# Patient Record
Sex: MALE | Race: BLACK OR AFRICAN AMERICAN | Employment: FULL TIME | URBAN - NONMETROPOLITAN AREA
[De-identification: names, ages, dates, MRNs, and addresses within clinical notes are randomized per-mention and may not be internally consistent; named-entity substitution may affect disease eponyms.]

---

## 2021-03-02 ENCOUNTER — HOSPITAL ENCOUNTER (EMERGENCY)
Age: 51
Discharge: HOME | End: 2021-03-02
Attending: EMERGENCY MEDICINE

## 2021-03-02 VITALS
HEART RATE: 89 BPM | RESPIRATION RATE: 16 BRPM | BODY MASS INDEX: 24.44 KG/M2 | OXYGEN SATURATION: 98 % | WEIGHT: 165 LBS | TEMPERATURE: 98.1 F | SYSTOLIC BLOOD PRESSURE: 139 MMHG | DIASTOLIC BLOOD PRESSURE: 79 MMHG | HEIGHT: 69 IN

## 2021-03-02 DIAGNOSIS — M54.50 ACUTE BILATERAL LOW BACK PAIN WITHOUT SCIATICA: Primary | ICD-10-CM

## 2021-03-02 LAB
APPEARANCE UR: CLEAR
BACTERIA URNS QL MICRO: NEGATIVE /HPF
BILIRUB UR QL: NEGATIVE
COLOR UR: ABNORMAL
GLUCOSE UR STRIP.AUTO-MCNC: NEGATIVE MG/DL
HGB UR QL STRIP: ABNORMAL
KETONES UR QL STRIP.AUTO: NEGATIVE MG/DL
LEUKOCYTE ESTERASE UR QL STRIP.AUTO: ABNORMAL
NITRITE UR QL STRIP.AUTO: NEGATIVE
PH UR STRIP: 5.5 [PH] (ref 5–8)
PROT UR STRIP-MCNC: NEGATIVE MG/DL
RBC #/AREA URNS HPF: NORMAL /HPF (ref 0–3)
SP GR UR REFRACTOMETRY: 1 (ref 1–1.03)
UROBILINOGEN UR QL STRIP.AUTO: 0.2 EU/DL (ref 0.2–1)
WBC URNS QL MICRO: NORMAL /HPF (ref 0–5)

## 2021-03-02 PROCEDURE — 81001 URINALYSIS AUTO W/SCOPE: CPT

## 2021-03-02 PROCEDURE — 99283 EMERGENCY DEPT VISIT LOW MDM: CPT

## 2021-03-02 PROCEDURE — 74011250636 HC RX REV CODE- 250/636: Performed by: EMERGENCY MEDICINE

## 2021-03-02 PROCEDURE — 74011250637 HC RX REV CODE- 250/637: Performed by: EMERGENCY MEDICINE

## 2021-03-02 PROCEDURE — 96372 THER/PROPH/DIAG INJ SC/IM: CPT

## 2021-03-02 PROCEDURE — 74011636637 HC RX REV CODE- 636/637: Performed by: EMERGENCY MEDICINE

## 2021-03-02 RX ORDER — METHOCARBAMOL 750 MG/1
750 TABLET, FILM COATED ORAL 4 TIMES DAILY
Qty: 28 TAB | Refills: 0 | Status: SHIPPED | OUTPATIENT
Start: 2021-03-02 | End: 2021-03-09

## 2021-03-02 RX ORDER — PREDNISONE 20 MG/1
40 TABLET ORAL
Status: COMPLETED | OUTPATIENT
Start: 2021-03-02 | End: 2021-03-02

## 2021-03-02 RX ORDER — KETOROLAC TROMETHAMINE 30 MG/ML
60 INJECTION, SOLUTION INTRAMUSCULAR; INTRAVENOUS
Status: COMPLETED | OUTPATIENT
Start: 2021-03-02 | End: 2021-03-02

## 2021-03-02 RX ORDER — HYDROCODONE BITARTRATE AND ACETAMINOPHEN 5; 325 MG/1; MG/1
2 TABLET ORAL
Status: COMPLETED | OUTPATIENT
Start: 2021-03-02 | End: 2021-03-02

## 2021-03-02 RX ADMIN — KETOROLAC TROMETHAMINE 60 MG: 30 INJECTION, SOLUTION INTRAMUSCULAR at 12:31

## 2021-03-02 RX ADMIN — PREDNISONE 40 MG: 20 TABLET ORAL at 12:30

## 2021-03-02 RX ADMIN — HYDROCODONE BITARTRATE AND ACETAMINOPHEN 2 TABLET: 5; 325 TABLET ORAL at 12:30

## 2021-03-02 NOTE — ED NOTES
I have reviewed discharge instructions with the patient. The patient verbalized understanding. Pts verbally confirmed understanding of need for follow up with primary care provider. Important sections of discharge instructions highlighted for patient. Regine Yang RN      Pt is not in any current distress and shows no evidence of clinical instability. Pt is hemodynamically/respiratorily stable. Armband removed. Paperwork given by provider and reviewed with patient opportunity for questions/clarification given. Pt denies any additional complaints. Pt ambulated out of ED.     Patient Vitals for the past 4 hrs:   Temp Pulse Resp BP SpO2   03/02/21 1041 98.1 °F (36.7 °C) 89 16 139/79 98 %         Regine Yang RN

## 2021-03-02 NOTE — Clinical Note
[address] 
[facility] EMERGENCY DEPT 
[address] 
[phone] Work/School Note Date: 3/2/2021 To Whom It May concern: 
 
Kennedy Adan was seen and treated today in the emergency room by the following provider(s): 
Attending Provider: Christina Kessler MD. Kennedy Adan is excused from work/school on 03/02/21 and 03/03/21. He is medically clear to return to work/school on 3/4/2021.   
 
 
Sincerely, 
 
 
 
 
Enrrique Munoz MD

## 2021-03-02 NOTE — DISCHARGE INSTRUCTIONS
Thank you! Thank you for allowing me to care for you in the emergency department. I sincerely hope that you are satisfied with your visit today. It is my goal to provide you with excellent care. Below you will find a list of your labs and imaging from your visit today. Should you have any questions regarding these results please do not hesitate to call the emergency department. Labs -     Recent Results (from the past 12 hour(s))   URINALYSIS W/ RFLX MICROSCOPIC    Collection Time: 03/02/21 10:50 AM   Result Value Ref Range    Color Yellow/Straw      Appearance Clear Clear      Specific gravity 1.005 1.003 - 1.030      pH (UA) 5.5 5.0 - 8.0      Protein Negative Negative mg/dL    Glucose Negative Negative mg/dL    Ketone Negative Negative mg/dL    Bilirubin Negative Negative      Blood Trace (A) Negative      Urobilinogen 0.2 0.2 - 1.0 EU/dL    Nitrites Negative Negative      Leukocyte Esterase Small (A) Negative     URINE MICROSCOPIC    Collection Time: 03/02/21 10:50 AM   Result Value Ref Range    WBC 5-10 0 - 5 /hpf    RBC 0-5 0 - 3 /hpf    Bacteria Negative Negative /hpf       Radiologic Studies -   No orders to display     CT Results  (Last 48 hours)      None          CXR Results  (Last 48 hours)      None               If you feel that you have not received excellent quality care or timely care, please ask to speak to the nurse manager. Please choose us in the future for your continued health care needs. ------------------------------------------------------------------------------------------------------------  The exam and treatment you received in the Emergency Department were for an urgent problem and are not intended as complete care. It is important that you follow-up with a doctor, nurse practitioner, or physician assistant to:  (1) confirm your diagnosis,  (2) re-evaluation of changes in your illness and treatment, and  (3) for ongoing care.   If your symptoms become worse or you do not improve as expected and you are unable to reach your usual health care provider, you should return to the Emergency Department. We are available 24 hours a day. Please take your discharge instructions with you when you go to your follow-up appointment. If you have any problem arranging a follow-up appointment, contact the Emergency Department immediately. If a prescription has been provided, please have it filled as soon as possible to prevent a delay in treatment. Read the entire medication instruction sheet provided to you by the pharmacy. If you have any questions or reservations about taking the medication due to side effects or interactions with other medications, please call your primary care physician or contact the ER to speak with the charge nurse. Make an appointment with your family doctor or the physician you were referred to for follow-up of this visit as instructed on your discharge paperwork, as this is a mandatory follow-up. Return to the ER if you are unable to be seen or if you are unable to be seen in a timely manner. If you have any problem arranging the follow-up visit, contact the Emergency Department immediately.

## 2021-03-03 NOTE — ED PROVIDER NOTES
EMERGENCY DEPARTMENT HISTORY AND PHYSICAL EXAM      Date: 3/2/2021  Patient Name: Hugh López    History of Presenting Illness     Chief Complaint   Patient presents with    Back Pain       History Provided By: Patient    HPI: Hugh López, 45 y.o. male with a past medical history significant hypertension, osteoarthritis and Lumbar Strain presents to the ED with cc of severe back spasms x 24 hours. Symptoms worsen with movement. Pain scale was 9/10. No Hx of trauma, no lower extremities numbness or tingling, no edema, no bleeding, no fevers, no vomiting, no headaches and no Hx of ataxia    There are no other complaints, changes, or physical findings at this time. PCP: None    No current facility-administered medications on file prior to encounter. No current outpatient medications on file prior to encounter. Past History     Past Medical History: H/o Hypertension, Back Spasms  History reviewed. No pertinent past medical history. Past Surgical History: Non-contributory  History reviewed. No pertinent surgical history. Family History: H/O Hypertension & Cancer   History reviewed. No pertinent family history. Social History: with Children  Social History     Tobacco Use    Smoking status: Current Every Day Smoker     Packs/day: 0.25    Smokeless tobacco: Never Used   Substance Use Topics    Alcohol use: Never     Frequency: Never    Drug use: Never       Allergies:  No Known Allergies      Review of Systems     Review of Systems   Constitutional: Negative. HENT: Negative. Eyes: Negative. Respiratory: Negative. Cardiovascular: Negative. Gastrointestinal: Negative. Endocrine: Negative. Genitourinary: Negative. Musculoskeletal: Positive for back pain and myalgias. Allergic/Immunologic: Negative. Neurological: Negative. Hematological: Negative. Psychiatric/Behavioral: Negative.         Physical Exam     Physical Exam  Vitals signs and nursing note reviewed. Constitutional:       Appearance: Normal appearance. He is normal weight. HENT:      Head: Normocephalic and atraumatic. Nose: Nose normal.      Mouth/Throat:      Mouth: Mucous membranes are dry. Eyes:      Extraocular Movements: Extraocular movements intact. Conjunctiva/sclera: Conjunctivae normal.      Pupils: Pupils are equal, round, and reactive to light. Neck:      Musculoskeletal: Normal range of motion and neck supple. Cardiovascular:      Rate and Rhythm: Normal rate and regular rhythm. Pulses: Normal pulses. Heart sounds: Normal heart sounds. Pulmonary:      Effort: Pulmonary effort is normal.      Breath sounds: Normal breath sounds. Abdominal:      General: Abdomen is flat. Bowel sounds are normal.      Palpations: Abdomen is soft. Musculoskeletal:         General: Swelling and tenderness present. Lumbar back: He exhibits decreased range of motion, tenderness and pain. Back:    Skin:     General: Skin is dry. Neurological:      General: No focal deficit present. Mental Status: He is alert and oriented to person, place, and time. Mental status is at baseline. Psychiatric:         Mood and Affect: Mood normal.         Lab and Diagnostic Study Results     Labs -   No results found for this or any previous visit (from the past 12 hour(s)). Radiologic Studies -   @lastxrresult@  CT Results  (Last 48 hours)    None        CXR Results  (Last 48 hours)    None            Medical Decision Making   - I am the first provider for this patient. - I reviewed the vital signs, available nursing notes, past medical history, past surgical history, family history and social history. - Initial assessment performed. The patients presenting problems have been discussed, and they are in agreement with the care plan formulated and outlined with them. I have encouraged them to ask questions as they arise throughout their visit.     Vital Signs-Reviewed the patient's vital signs. No data found. Records Reviewed: Nursing Notes    The patient presents with back pain with a differential diagnosis of  kidney stone, lumbar strain, pneumonia, pulmonary embolus, traumatic injury and viral syndrome      ED Course:   Patient's symptoms and examination was noted and reviewed. Patient's findings were consistent with Back spasms and was given pain medicines which improved his symptoms and was discharged to home. Provider Notes (Medical Decision Making):   Patient's symptoms and examination was noted and reviewed. Patient was given pain medicines which improved his symptoms and was discharged to home. MDM       Procedures   Medical Decision Makingedical Decision Making  Performed by: Geronimo Dumont MD  PROCEDURES  None  Procedures       Disposition   Disposition: DC- Adult Discharges: All of the diagnostic tests were reviewed and questions answered. Diagnosis, care plan and treatment options were discussed. The patient understands the instructions and will follow up as directed. The patients results have been reviewed with them. They have been counseled regarding their diagnosis. The patient verbally convey understanding and agreement of the signs, symptoms, diagnosis, treatment and prognosis and additionally agrees to follow up as recommended with their PCP in 24 - 48 hours. They also agree with the care-plan and convey that all of their questions have been answered. I have also put together some discharge instructions for them that include: 1) educational information regarding their diagnosis, 2) how to care for their diagnosis at home, as well a 3) list of reasons why they would want to return to the ED prior to their follow-up appointment, should their condition change. Discharged    DISCHARGE PLAN:  1. Cannot display discharge medications since this patient is not currently admitted.     2.   Follow-up Information     Follow up With Specialties Details Why Veda Beaver MD Internal Medicine Call in 3 days If symptoms worsen [address] [facility]  [phone]          3. Return to ED if worse   4. Discharge Medication List as of 3/2/2021  1:09 PM            Diagnosis     Clinical Impression:   1. Acute bilateral low back pain without sciatica        Attestations:    Milan Guardado MD    Please note that this dictation was completed with [employer], the computer voice recognition software. Quite often unanticipated grammatical, syntax, homophones, and other interpretive errors are inadvertently transcribed by the computer software. Please disregard these errors. Please excuse any errors that have escaped final proofreading. Thank you.

## 2021-07-07 ENCOUNTER — APPOINTMENT (OUTPATIENT)
Dept: ULTRASOUND IMAGING | Age: 51
End: 2021-07-07
Attending: EMERGENCY MEDICINE

## 2021-07-07 ENCOUNTER — HOSPITAL ENCOUNTER (EMERGENCY)
Age: 51
Discharge: HOME | End: 2021-07-07
Attending: EMERGENCY MEDICINE

## 2021-07-07 VITALS
SYSTOLIC BLOOD PRESSURE: 133 MMHG | OXYGEN SATURATION: 97 % | DIASTOLIC BLOOD PRESSURE: 96 MMHG | HEART RATE: 98 BPM | RESPIRATION RATE: 16 BRPM | TEMPERATURE: 98.4 F

## 2021-07-07 DIAGNOSIS — N45.2 ORCHITIS: Primary | ICD-10-CM

## 2021-07-07 LAB
APPEARANCE UR: CLEAR
BACTERIA URNS QL MICRO: ABNORMAL /HPF
BILIRUB UR QL: NEGATIVE
COLOR UR: ABNORMAL
GLUCOSE UR STRIP.AUTO-MCNC: 250 MG/DL
HGB UR QL STRIP: ABNORMAL
KETONES UR QL STRIP.AUTO: ABNORMAL MG/DL
LEUKOCYTE ESTERASE UR QL STRIP.AUTO: ABNORMAL
NITRITE UR QL STRIP.AUTO: NEGATIVE
PH UR STRIP: 6 [PH] (ref 5–8)
PROT UR STRIP-MCNC: NEGATIVE MG/DL
RBC #/AREA URNS HPF: ABNORMAL /HPF (ref 0–3)
SP GR UR REFRACTOMETRY: 1.02 (ref 1–1.03)
UROBILINOGEN UR QL STRIP.AUTO: 0.2 EU/DL (ref 0.2–1)
WBC URNS QL MICRO: ABNORMAL /HPF (ref 0–5)

## 2021-07-07 PROCEDURE — 74011000250 HC RX REV CODE- 250: Performed by: EMERGENCY MEDICINE

## 2021-07-07 PROCEDURE — 74011250636 HC RX REV CODE- 250/636: Performed by: EMERGENCY MEDICINE

## 2021-07-07 PROCEDURE — 81001 URINALYSIS AUTO W/SCOPE: CPT

## 2021-07-07 PROCEDURE — 96372 THER/PROPH/DIAG INJ SC/IM: CPT

## 2021-07-07 PROCEDURE — 76870 US EXAM SCROTUM: CPT

## 2021-07-07 PROCEDURE — 74011250637 HC RX REV CODE- 250/637: Performed by: EMERGENCY MEDICINE

## 2021-07-07 PROCEDURE — 99283 EMERGENCY DEPT VISIT LOW MDM: CPT

## 2021-07-07 RX ORDER — DOXYCYCLINE 100 MG/1
100 CAPSULE ORAL
Status: COMPLETED | OUTPATIENT
Start: 2021-07-07 | End: 2021-07-07

## 2021-07-07 RX ORDER — DOXYCYCLINE HYCLATE 100 MG
100 TABLET ORAL 2 TIMES DAILY
Qty: 20 TABLET | Refills: 0 | Status: SHIPPED | OUTPATIENT
Start: 2021-07-07 | End: 2021-07-17

## 2021-07-07 RX ORDER — IBUPROFEN 800 MG/1
800 TABLET ORAL
Qty: 20 TABLET | Refills: 0 | Status: SHIPPED | OUTPATIENT
Start: 2021-07-07 | End: 2021-07-14

## 2021-07-07 RX ORDER — HYDROCODONE BITARTRATE AND ACETAMINOPHEN 7.5; 325 MG/1; MG/1
1 TABLET ORAL ONCE
Status: COMPLETED | OUTPATIENT
Start: 2021-07-07 | End: 2021-07-07

## 2021-07-07 RX ORDER — KETOROLAC TROMETHAMINE 30 MG/ML
60 INJECTION, SOLUTION INTRAMUSCULAR; INTRAVENOUS
Status: COMPLETED | OUTPATIENT
Start: 2021-07-07 | End: 2021-07-07

## 2021-07-07 RX ADMIN — HYDROCODONE BITARTRATE AND ACETAMINOPHEN 1 TABLET: 7.5; 325 TABLET ORAL at 14:26

## 2021-07-07 RX ADMIN — LIDOCAINE HYDROCHLORIDE 250 MG: 10 INJECTION, SOLUTION INFILTRATION; PERINEURAL at 14:26

## 2021-07-07 RX ADMIN — DOXYCYCLINE 100 MG: 100 CAPSULE ORAL at 14:26

## 2021-07-07 RX ADMIN — KETOROLAC TROMETHAMINE 60 MG: 30 INJECTION, SOLUTION INTRAMUSCULAR at 14:26

## 2021-07-07 NOTE — ED PROVIDER NOTES
EMERGENCY DEPARTMENT HISTORY AND PHYSICAL EXAM      Date: 7/7/2021  Patient Name: Mariana Hartley    History of Presenting Illness     Chief Complaint   Patient presents with    Groin Pain       History Provided By: Patient    HPI: Mariana Hartley, 45 y.o. male with a past medical history significant No significant past medical history presents to the ED with cc of testicular pain for 2 days, patient denies any heavy lifting but stated that 2 days ago he was wearing boxer shorts while he was doing some vigorous dancing; pain intensity rated 10/10 worsened with direct pressure and ambulation    There are no other complaints, changes, or physical findings at this time. PCP: None    No current facility-administered medications on file prior to encounter. No current outpatient medications on file prior to encounter. Past History     Past Medical History:  History reviewed. No pertinent past medical history. Past Surgical History:  No past surgical history on file. Family History:  History reviewed. No pertinent family history. Social History:  Social History     Tobacco Use    Smoking status: Current Every Day Smoker     Packs/day: 0.25    Smokeless tobacco: Never Used   Substance Use Topics    Alcohol use: Never    Drug use: Never       Allergies:  No Known Allergies      Review of Systems     Review of Systems   Constitutional: Negative for chills and fever. HENT: Negative for rhinorrhea and sore throat. Eyes: Negative for pain and visual disturbance. Respiratory: Negative for cough and shortness of breath. Cardiovascular: Negative for chest pain and leg swelling. Gastrointestinal: Negative for abdominal pain and vomiting. Endocrine: Negative for polydipsia and polyuria. Genitourinary: Positive for scrotal swelling and testicular pain. Negative for dysuria. Musculoskeletal: Negative for back pain and neck pain. Skin: Negative for color change and pallor.    Neurological: Negative for weakness and numbness. Psychiatric/Behavioral: Negative. Physical Exam     Physical Exam  Vitals and nursing note reviewed. Constitutional:       General: He is not in acute distress. Appearance: Normal appearance. He is not ill-appearing or toxic-appearing. HENT:      Head: Normocephalic and atraumatic. Mouth/Throat:      Mouth: Mucous membranes are moist.      Pharynx: Oropharynx is clear. Eyes:      Extraocular Movements: Extraocular movements intact. Conjunctiva/sclera: Conjunctivae normal.      Pupils: Pupils are equal, round, and reactive to light. Cardiovascular:      Rate and Rhythm: Normal rate and regular rhythm. Pulses: Normal pulses. Heart sounds: Normal heart sounds. Pulmonary:      Effort: Pulmonary effort is normal.      Breath sounds: Normal breath sounds. Abdominal:      General: Bowel sounds are normal.   Genitourinary:     Penis: Normal and circumcised. Testes: Cremasteric reflex is present. Right: Tenderness not present. Left: Tenderness and swelling present. Epididymis:      Right: Normal.      Left: Normal.   Musculoskeletal:         General: No tenderness. Normal range of motion. Cervical back: Normal range of motion and neck supple. Lymphadenopathy:      Lower Body: Right inguinal adenopathy present. Left inguinal adenopathy present. Skin:     General: Skin is warm and dry. Capillary Refill: Capillary refill takes less than 2 seconds. Neurological:      General: No focal deficit present. Mental Status: He is alert and oriented to person, place, and time. Psychiatric:         Mood and Affect: Mood normal.         Behavior: Behavior normal.         Lab and Diagnostic Study Results     Labs -   No results found for this or any previous visit (from the past 12 hour(s)).     Radiologic Studies -   @lastxrresult@  CT Results  (Last 48 hours)    None        CXR Results  (Last 48 hours)    None Medical Decision Making   - I am the first provider for this patient. - I reviewed the vital signs, available nursing notes, past medical history, past surgical history, family history and social history. - Initial assessment performed. The patients presenting problems have been discussed, and they are in agreement with the care plan formulated and outlined with them. I have encouraged them to ask questions as they arise throughout their visit. Vital Signs-Reviewed the patient's vital signs. Patient Vitals for the past 12 hrs:   Temp Pulse Resp BP SpO2   07/07/21 1344 98.4 °F (36.9 °C) 98 16 (!) 133/96 99 %       Records Reviewed: Nursing Notes    The patient presents with testicular pain with a differential diagnosis of testicular torsion, orchitis, epididymitis      ED Course:          Provider Notes (Medical Decision Making): MDM       Procedures   Medical Decision Makingedical Decision Making  Performed by: Shanique Jim MD  PROCEDURES:  Procedures       Disposition   Disposition: Condition stable and improved  DC- Adult Discharges: All of the diagnostic tests were reviewed and questions answered. Diagnosis, care plan and treatment options were discussed. The patient understands the instructions and will follow up as directed. The patients results have been reviewed with them. They have been counseled regarding their diagnosis. The patient verbally convey understanding and agreement of the signs, symptoms, diagnosis, treatment and prognosis and additionally agrees to follow up as recommended with their PCP in 24 - 48 hours. They also agree with the care-plan and convey that all of their questions have been answered.   I have also put together some discharge instructions for them that include: 1) educational information regarding their diagnosis, 2) how to care for their diagnosis at home, as well a 3) list of reasons why they would want to return to the ED prior to their follow-up appointment, should their condition change. DISCHARGE PLAN:  1. There are no discharge medications for this patient. 2.   Follow-up Information    None       3. Return to ED if worse   4. There are no discharge medications for this patient. Diagnosis     Clinical Impression: No diagnosis found. Attestations:    Sourav Yuan MD    Please note that this dictation was completed with [employer], the computer voice recognition software. Quite often unanticipated grammatical, syntax, homophones, and other interpretive errors are inadvertently transcribed by the computer software. Please disregard these errors. Please excuse any errors that have escaped final proofreading. Thank you.

## 2021-07-07 NOTE — LETTER
NOTIFICATION RETURN TO WORK / SCHOOL    7/7/2021 4:15 PM    Mr. Sri Mata  [id number]      To Whom It May Concern:    Sri Mata is currently under the care of [facility] EMERGENCY DEPT. He will return to work/school on:7/14/21    Sri Mata may return to work/school with the following restrictions: Follow up with PCP before returning to work    If there are questions or concerns please have the patient contact our office.         Sincerely,      Yudelka Hoover RN

## 2021-07-07 NOTE — ED TRIAGE NOTES
Pt reports left testicular pain that started Monday. Pt states he was dancing Sunday night and believed that was the cause. Pt is not sure if it is swollen.  Pt denies any issues with urinatin